# Patient Record
(demographics unavailable — no encounter records)

---

## 2024-11-14 NOTE — PLAN
[FreeTextEntry1] : Pt presented with mom for 1st FLU vaccine. Fluzone ordered and supervised by Dr. Wen. 0.5ml injected into left thigh. Pt discharged and advised since 1st FLU vaccine exposure will next 2nd dose in 1 month. Verbalized understanding

## 2024-12-11 NOTE — REASON FOR VISIT
[Mother] : mother [TextEntry] : Pt presents to clinic with mom for 2nd flu vaccine. Fluzone ordered and supervised by Dr. Rao. RN administered Fluzone 0.5ml IM into left thigh. Pt tolerated the injection and discharged.

## 2025-02-03 NOTE — DISCUSSION/SUMMARY
[Normal Growth] : growth [Normal Development] : development [No Elimination Concerns] : elimination [No Feeding Concerns] : feeding [Healthy Teeth] : healthy teeth [Safety] : safety [No Medications] : ~He/She~ is not on any medications [] : The components of the vaccine(s) to be administered today are listed in the plan of care. The disease(s) for which the vaccine(s) are intended to prevent and the risks have been discussed with the caretaker.  The risks are also included in the appropriate vaccination information statements which have been provided to the patient's caregiver.  The caregiver has given consent to vaccinate. [de-identified] : papular rash  [FreeTextEntry1] :  17m F with h/o G6PD deficiency presenting for 15m HCM visit. Growth and development normal. PE remarkable for clear nasal discharge and congestion. Likely viral. No fever or difficulty breathing. Immunizations UTD.  - Routine care & anticipatory guidance given - Polyvisol 1 ml daily  - Follow up with Heme/onc as planned  - CBC & lead reviewed and normal - Vaccines given: DTaP, IPV and Prevnar - Post vaccine care discussed & potential side effects reviewed - Tylenol every 4 hours prn or Motrin every 6 hours prn for pain or fever - Choking hazards reviewed - Follow up with dental as planned- referral given  - RTC for 18 month old HCM and prn  Caretaker expressed understanding of the plan and agrees. All questions were answered.

## 2025-02-03 NOTE — HISTORY OF PRESENT ILLNESS
[Mother] : mother [Cow's milk (Ounces per day ___)] : consumes [unfilled] oz of cow's milk per day [Fruit] : fruit [Vegetables] : vegetables [Meat] : meat [Cereal] : cereal [Eggs] : eggs [Table food] : table food [___ stools per day] : [unfilled]  stools per day [Firm] : firm consistency [___ voids per day] : [unfilled] voids per day [Normal] : Normal [Wakes up at night] : Wakes up at night [Pacifier use] : Pacifier use [Bottle in bed] : Bottle in bed [None] : Primary Fluoride Source: None [Playtime] : Playtime [No] : Not at  exposure [Car seat in back seat] : Car seat in back seat [Carbon Monoxide Detectors] : Carbon monoxide detectors [Smoke Detectors] : Smoke detectors [Up to date] : Up to date [NO] : No [Exposure to electronic nicotine delivery system] : No exposure to electronic nicotine delivery system [de-identified] : No concerns  [de-identified] : No taking vitamins  [FreeTextEntry8] : brown stools  [FreeTextEntry3] : sleeps with mom. wakes at night at times  [de-identified] : Children's toothpaste- brushes twice per day  [de-identified] : Unsure of water heater temperature  [FreeTextEntry1] : 17m F with h/o G6PD deficiency presenting for 15m [facility] visit. Mother has met with Heme/onc and will be following up yearly to establish care. Eating a variety of foods with no issues or allergies. Currently not taking vitamins. Voiding and stooling well. Has not seen detal yet but brushes twice per day.

## 2025-02-03 NOTE — PHYSICAL EXAM
[Alert] : alert [No Acute Distress] : no acute distress [Normocephalic] : normocephalic [Anterior Lagro Closed] : anterior fontanelle closed [Red Reflex Bilateral] : red reflex bilateral [PERRL] : PERRL [Normally Placed Ears] : normally placed ears [Auricles Well Formed] : auricles well formed [Clear Tympanic membranes with present light reflex and bony landmarks] : clear tympanic membranes with present light reflex and bony landmarks [Nares Patent] : nares patent [Pink Nasal Mucosa] : pink nasal mucosa [Tooth Eruption] : tooth eruption  [Supple, full passive range of motion] : supple, full passive range of motion [No Palpable Masses] : no palpable masses [Symmetric Chest Rise] : symmetric chest rise [Clear to Auscultation Bilaterally] : clear to auscultation bilaterally [Regular Rate and Rhythm] : regular rate and rhythm [S1, S2 present] : S1, S2 present [No Murmurs] : no murmurs [NonTender] : non tender [Non Distended] : non distended [Normoactive Bowel Sounds] : normoactive bowel sounds [No Hepatomegaly] : no hepatomegaly [No Splenomegaly] : no splenomegaly [River 1] : River 1 [No Clitoromegaly] : no clitoromegaly [Normal Vaginal Introitus] : normal vaginal introitus [Patent] : patent [Normally Placed] : normally placed [Soft] : soft [Non Tender] : non tender [Mobile] : mobile [< 1 cm Lymph Nodes Palpated in the following Regions:] : <1 cm lymph nodes palpated in the following regions: [Submandibular] : submandibular [No Clavicular Crepitus] : no clavicular crepitus [Negative Chandler-Ortalani] : negative Chandler-Ortalani [Symmetric Buttocks Creases] : symmetric buttocks creases [No Spinal Dimple] : no spinal dimple [NoTuft of Hair] : no tuft of hair [Cranial Nerves Grossly Intact] : cranial nerves grossly intact [FreeTextEntry4] : clear nasal drainage [de-identified] : unable to visualize palate due to cooperation  [de-identified] : papular rash over back

## 2025-02-03 NOTE — PHYSICAL EXAM
[Alert] : alert [No Acute Distress] : no acute distress [Normocephalic] : normocephalic [Anterior Erwin Closed] : anterior fontanelle closed [Red Reflex Bilateral] : red reflex bilateral [PERRL] : PERRL [Normally Placed Ears] : normally placed ears [Auricles Well Formed] : auricles well formed [Clear Tympanic membranes with present light reflex and bony landmarks] : clear tympanic membranes with present light reflex and bony landmarks [Nares Patent] : nares patent [Pink Nasal Mucosa] : pink nasal mucosa [Tooth Eruption] : tooth eruption  [Supple, full passive range of motion] : supple, full passive range of motion [No Palpable Masses] : no palpable masses [Symmetric Chest Rise] : symmetric chest rise [Clear to Auscultation Bilaterally] : clear to auscultation bilaterally [Regular Rate and Rhythm] : regular rate and rhythm [S1, S2 present] : S1, S2 present [No Murmurs] : no murmurs [NonTender] : non tender [Non Distended] : non distended [Normoactive Bowel Sounds] : normoactive bowel sounds [No Hepatomegaly] : no hepatomegaly [No Splenomegaly] : no splenomegaly [River 1] : River 1 [No Clitoromegaly] : no clitoromegaly [Normal Vaginal Introitus] : normal vaginal introitus [Patent] : patent [Normally Placed] : normally placed [Soft] : soft [Non Tender] : non tender [Mobile] : mobile [< 1 cm Lymph Nodes Palpated in the following Regions:] : <1 cm lymph nodes palpated in the following regions: [Submandibular] : submandibular [No Clavicular Crepitus] : no clavicular crepitus [Negative Chandler-Ortalani] : negative Chandler-Ortalani [Symmetric Buttocks Creases] : symmetric buttocks creases [No Spinal Dimple] : no spinal dimple [NoTuft of Hair] : no tuft of hair [Cranial Nerves Grossly Intact] : cranial nerves grossly intact [FreeTextEntry4] : clear nasal drainage [de-identified] : unable to visualize palate due to cooperation  [de-identified] : papular rash over back

## 2025-02-03 NOTE — HISTORY OF PRESENT ILLNESS
[Mother] : mother [Cow's milk (Ounces per day ___)] : consumes [unfilled] oz of cow's milk per day [Fruit] : fruit [Vegetables] : vegetables [Meat] : meat [Cereal] : cereal [Eggs] : eggs [Table food] : table food [___ stools per day] : [unfilled]  stools per day [Firm] : firm consistency [___ voids per day] : [unfilled] voids per day [Normal] : Normal [Wakes up at night] : Wakes up at night [Pacifier use] : Pacifier use [Bottle in bed] : Bottle in bed [None] : Primary Fluoride Source: None [Playtime] : Playtime [No] : Not at  exposure [Car seat in back seat] : Car seat in back seat [Carbon Monoxide Detectors] : Carbon monoxide detectors [Smoke Detectors] : Smoke detectors [Up to date] : Up to date [NO] : No [Exposure to electronic nicotine delivery system] : No exposure to electronic nicotine delivery system [de-identified] : No concerns  [de-identified] : No taking vitamins  [FreeTextEntry8] : brown stools  [FreeTextEntry3] : sleeps with mom. wakes at night at times  [de-identified] : Children's toothpaste- brushes twice per day  [de-identified] : Unsure of water heater temperature  [FreeTextEntry1] : 17m F with h/o G6PD deficiency presenting for 15m [facility] visit. Mother has met with Heme/onc and will be following up yearly to establish care. Eating a variety of foods with no issues or allergies. Currently not taking vitamins. Voiding and stooling well. Has not seen detal yet but brushes twice per day.

## 2025-02-03 NOTE — DEVELOPMENTAL MILESTONES
[Normal Development] : Normal Development [None] : none [Imitates scribbling] : imitates scribbling [Drinks from cup with little] : drinks from cup with little spilling [Points to ask for something] : points to ask for something or to get help [Uses 3 words other than names] : uses 3 words other than names [Follows directions that do not] : follows direction that do not include a gesture [Looks when parent says,] : looks when parent says, "Where is...?" [Squats to  objects] : squats to  objects [Crawls up a few steps] : crawls up a few steps [Begins to run] : begins to run [Makes dimple with crayon] : makes dimple with neptaliyon [Drops object into and takes object] : drops object into and takes object out of container [Speaks in sounds that seem like] : does not speak in sounds that seem like an unknown language

## 2025-02-03 NOTE — DISCUSSION/SUMMARY
[Normal Growth] : growth [Normal Development] : development [No Elimination Concerns] : elimination [No Feeding Concerns] : feeding [Healthy Teeth] : healthy teeth [Safety] : safety [No Medications] : ~He/She~ is not on any medications [] : The components of the vaccine(s) to be administered today are listed in the plan of care. The disease(s) for which the vaccine(s) are intended to prevent and the risks have been discussed with the caretaker.  The risks are also included in the appropriate vaccination information statements which have been provided to the patient's caregiver.  The caregiver has given consent to vaccinate. [de-identified] : papular rash  [FreeTextEntry1] :  17m F with h/o G6PD deficiency presenting for 15m HCM visit. Growth and development normal. PE remarkable for clear nasal discharge and congestion. Likely viral. No fever or difficulty breathing. Immunizations UTD.  - Routine care & anticipatory guidance given - Polyvisol 1 ml daily  - Follow up with Heme/onc as planned  - CBC & lead reviewed and normal - Vaccines given: DTaP, IPV and Prevnar - Post vaccine care discussed & potential side effects reviewed - Tylenol every 4 hours prn or Motrin every 6 hours prn for pain or fever - Choking hazards reviewed - Follow up with dental as planned- referral given  - RTC for 18 month old HCM and prn  Caretaker expressed understanding of the plan and agrees. All questions were answered.

## 2025-07-10 NOTE — ASSESSMENT
[FreeTextEntry1] : Adenotonsillar hypertrophy, Sleep disordered breathing.  Recommend pediatric sleep study.  Follow up in 2-3 months to review results.   Total time spent on patient encounter including review of patient's medical history, physical examination, interpretation of any indicated labs / imaging and counseling, excluding time spent performing any indicated procedures: 47 minutes.     George Murillo MD, MPH Director of Pediatric Otolaryngology [facility] / [facility]

## 2025-07-10 NOTE — HISTORY OF PRESENT ILLNESS
[de-identified] : Patient presents today with her mom c/o tongue tie , lisp, snoring , enlarged tonsils .  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 Mom says she says some words but was not told that she was speech delayed.

## 2025-07-10 NOTE — HISTORY OF PRESENT ILLNESS
[de-identified] : Patient presents today with her mom c/o tongue tie , lisp, snoring , enlarged tonsils .  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 Mom says she says some words but was not told that she was speech delayed.

## 2025-07-10 NOTE — REASON FOR VISIT
[Initial Evaluation] : an initial evaluation for [FreeTextEntry2] : tongue tie , lisp, snoring , enlarged tonsils

## 2025-07-10 NOTE — PHYSICAL EXAM
[de-identified] : mild edema [Midline] : trachea located in midline position [de-identified] : 3+ [Normal] : palpation of lymph nodes is normal

## 2025-07-10 NOTE — PHYSICAL EXAM
[de-identified] : mild edema [Midline] : trachea located in midline position [de-identified] : 3+ [Normal] : palpation of lymph nodes is normal

## 2025-07-10 NOTE — ASSESSMENT
[FreeTextEntry1] : Adenotonsillar hypertrophy, Sleep disordered breathing.  Recommend pediatric sleep study.  Follow up in 2-3 months to review results.   Total time spent on patient encounter including review of patient's medical history, physical examination, interpretation of any indicated labs / imaging and counseling, excluding time spent performing any indicated procedures: 47 minutes.     George Murillo MD, MPH Director of Pediatric Otolaryngology [facility] / [facility]